# Patient Record
Sex: MALE | Race: OTHER | NOT HISPANIC OR LATINO | Employment: FULL TIME | URBAN - NONMETROPOLITAN AREA
[De-identification: names, ages, dates, MRNs, and addresses within clinical notes are randomized per-mention and may not be internally consistent; named-entity substitution may affect disease eponyms.]

---

## 2017-01-25 ENCOUNTER — TELEPHONE (OUTPATIENT)
Dept: ORTHOPEDIC SURGERY | Facility: CLINIC | Age: 57
End: 2017-01-25

## 2017-01-25 DIAGNOSIS — R52 PAIN: ICD-10-CM

## 2017-01-25 RX ORDER — MELOXICAM 15 MG/1
15 TABLET ORAL DAILY
Qty: 45 TABLET | Refills: 1 | Status: SHIPPED | OUTPATIENT
Start: 2017-01-25 | End: 2017-03-09 | Stop reason: SDUPTHER

## 2017-01-25 NOTE — TELEPHONE ENCOUNTER
Patient called needing an appt. For a shoulder injection and asked for a refill on his Meloxicam to get him through until he is seen. This was e-scribed to [facility] per RONAL/fran.

## 2017-03-09 ENCOUNTER — OFFICE VISIT (OUTPATIENT)
Dept: ORTHOPEDIC SURGERY | Facility: CLINIC | Age: 57
End: 2017-03-09

## 2017-03-09 DIAGNOSIS — R52 PAIN: ICD-10-CM

## 2017-03-09 DIAGNOSIS — Z96.642 STATUS POST TOTAL HIP REPLACEMENT, LEFT: ICD-10-CM

## 2017-03-09 DIAGNOSIS — M25.511 CHRONIC RIGHT SHOULDER PAIN: Primary | ICD-10-CM

## 2017-03-09 DIAGNOSIS — G89.29 CHRONIC RIGHT SHOULDER PAIN: Primary | ICD-10-CM

## 2017-03-09 DIAGNOSIS — M16.11 PRIMARY OSTEOARTHRITIS OF RIGHT HIP: ICD-10-CM

## 2017-03-09 PROCEDURE — 20610 DRAIN/INJ JOINT/BURSA W/O US: CPT | Performed by: ORTHOPAEDIC SURGERY

## 2017-03-09 PROCEDURE — 99214 OFFICE O/P EST MOD 30 MIN: CPT | Performed by: ORTHOPAEDIC SURGERY

## 2017-03-09 RX ORDER — MELOXICAM 15 MG/1
15 TABLET ORAL DAILY
Qty: 30 TABLET | Refills: 2 | Status: SHIPPED | OUTPATIENT
Start: 2017-03-09 | End: 2017-03-09 | Stop reason: SDUPTHER

## 2017-03-09 RX ORDER — MELOXICAM 15 MG/1
15 TABLET ORAL DAILY
Qty: 30 TABLET | Refills: 2 | Status: SHIPPED | OUTPATIENT
Start: 2017-03-09

## 2017-03-09 RX ADMIN — LIDOCAINE HYDROCHLORIDE 2 ML: 10 INJECTION, SOLUTION INFILTRATION; PERINEURAL at 15:10

## 2017-03-09 RX ADMIN — METHYLPREDNISOLONE ACETATE 160 MG: 80 INJECTION, SUSPENSION INTRA-ARTICULAR; INTRALESIONAL; INTRAMUSCULAR; SOFT TISSUE at 15:10

## 2017-03-09 NOTE — PROGRESS NOTES
Chief Complaint   Patient presents with   • Right Shoulder - Follow-up           HPI the patient is here today for a follow up of his right shoulder.  Patient states that he does have some shoulder pain with significant weakness in abduction.  He is unable to reach into the overhead position.  He has difficulty with cross body activities.  He is also complaining of some right hip pain but that is very tolerable.  The shoulder bothers him significantly because he is in a job requires a lot of repetitive motion specifically overhead and abducted shoulder position in the repetitive loading situation.  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  The patient is trying is best to change her job description and has been somewhat successful in getting a lighter job that is less stressful for the shoulder.  He still has lots of issues with his upper extremity.  He's had a prior surgical intervention by an orthopedic surgeon in [facility] which unfortunately did not produce any grade outcomes for this patient.  He is basically told that he has a large, unreconstructable rotator cuff tear that is not repairable  and eventually he is going to require a reverse total shoulder arthroplasty.        There were no vitals filed for this visit.        Review of Systems   Constitutional: Negative for activity change, appetite change, fatigue, fever and unexpected weight change.   HENT: Negative for congestion, sneezing and voice change.    Eyes: Negative for visual disturbance.   Respiratory: Negative for cough, chest tightness and wheezing.    Cardiovascular: Negative for chest pain, palpitations and leg swelling.   Gastrointestinal: Negative for abdominal distention, constipation, diarrhea and vomiting.   Endocrine: Negative for polydipsia, polyphagia and polyuria.   Genitourinary: Negative for decreased urine volume, difficulty urinating, dysuria, flank pain and frequency.    Musculoskeletal: Positive for myalgias. Negative for arthralgias, back pain, gait problem, joint swelling, neck pain and neck stiffness.   Skin: Negative for color change, pallor and wound.   Allergic/Immunologic: Negative for environmental allergies and food allergies.   Neurological: Negative for dizziness, weakness and headaches.   Hematological: Does not bruise/bleed easily.   Psychiatric/Behavioral: Negative for agitation, confusion, decreased concentration and sleep disturbance. The patient is not nervous/anxious.            Physical Exam   Constitutional: He is oriented to person, place, and time. He appears well-developed and well-nourished.   HENT:   Head: Normocephalic.   Eyes: Conjunctivae are normal. Pupils are equal, round, and reactive to light.   Neck: Normal range of motion. Neck supple. No JVD present.   Cardiovascular: Normal rate, normal heart sounds and intact distal pulses.    Pulmonary/Chest: Effort normal and breath sounds normal. No respiratory distress.   Abdominal: Soft. Bowel sounds are normal. There is no tenderness. There is no guarding.   Lymphadenopathy:     He has no cervical adenopathy.   Neurological: He is alert and oriented to person, place, and time. He has normal reflexes.   Skin: Skin is warm and dry.   Psychiatric: His behavior is normal. Judgment and thought content normal.   Vitals reviewed.          Joint/Body Part Specific Exam:  right shoulder. The shoulder is extremely tender anteriorly. There is tenderness over the insertion of the rotator cuff over the greater tuberosity of the humerus. Slight proximal migration of the humeral head is noted. AC joint is tender. Crossover adduction test is positive. Drop arm sign is positive. Forward flexion is 0-100 and degrees, abduction is 0-120 degrees, external rotation is 0-40 degrees. Patient has mild atrophy both of the supra and infraspinatus fossa. Sulcus sign is negative. There is no evidence of multidirectional  instability. Neer test is positive on compression. Skin and soft tissue tenderness is noted. Patient’s strength in abduction and external rotation are extremely lacking. The pain level is 5.  Prior healed incisions from an arthroscopic surgery are visible.    right Hip. Skin and soft tissues over the greater trochanteric bursa are painful and tender for the patient. Neurovascular status is intact. IT band is painful and tender. Cross body adduction bothers the patient significantly. Internal and external rotations bother the patient significantly with tenderness over the greater trochanter. There is no clinical deformity. No shortening. The patient does have a significant limp because by the trochanteric pain caused by the abductors. The piriformis is tight and with any rotation there is significant capsular tightness. Dorsalis pedis and posterior tibial artery pulses are palpable. Capillary refill is 2 seconds with a brisk return. Common peroneal nerve function is well preserved.      Diagnostics:        Demarcus was seen today for follow-up.    Diagnoses and all orders for this visit:    Chronic right shoulder pain  -     Large Joint Arthrocentesis    Pain  -     Discontinue: meloxicam (MOBIC) 15 MG tablet; Take 1 tablet by mouth Daily.  -     meloxicam (MOBIC) 15 MG tablet; Take 1 tablet by mouth Daily.    Primary osteoarthritis of right hip    Status post total hip replacement, left          Large Joint Arthrocentesis  Date/Time: 3/9/2017 3:10 PM  Consent given by: patient  Site marked: site marked  Timeout: Immediately prior to procedure a time out was called to verify the correct patient, procedure, equipment, support staff and site/side marked as required   Supporting Documentation  Indications: pain   Procedure Details  Location: shoulder - R subacromial bursa  Preparation: Patient was prepped and draped in the usual sterile fashion  Needle size: 25 G  Approach: anteromedial  Medications administered: 2 mL  lidocaine 1 %; 160 mg methylPREDNISolone acetate 80 MG/ML  Patient tolerance: patient tolerated the procedure well with no immediate complications              Plan:  , GI and dental procedure prophylaxis with antibiotics for the left hip arthroplasty implants to prevent a metastatic infection.    Weightbearing as tolerated.      Time spent in the office the patient 30 minutes.    Home-based exercise program to the shoulder to prevent a frozen shoulder.    No indication for surgical intervention of the shoulder at this point.  He is he can be addressed.    Tablet ibuprofen 600 mg tab 1 by mouth twice a day when necessary pain and discomfort.    The patient was seen in the office today for preoperative discussion.  The patient has been tried on over-the-counter and prescription NSAID's despite the risks of anti-inflammatory bleeding, peptic ulcers and erosive gastritis with short term benefit only.  Braces have been prescribed for mechanical support.  Patient has been participating in an exercise program specifically targeting joint pain relief with limited benefit. Intraarticular injections have been used periodically with some but not complete relief of pain.  Ambulation aids have also been utilized.      The details of the surgical procedure were explained including the location of probable incisions and a description of the likely hardware/grafts to be used. The patient understands the likely convalescence after surgery as well as the rehabilitation required.  Also, we have thoroughly discussed with the patient the risks, benefits and alternatives to surgery.  Risks include but are not limited to the risk of infection, joint stiffness, limited range of motion, wound healing problems, scar tissue build up, myocardial infarction, stroke, blood clots (including DVT and/or pulmonary embolus along with the risk of death) neurologic and/or vascular injury, limb length discrepancy, fracture, dislocation, nonunion,  malunion, continued pain and need for further surgery including hardware failure requiring revision.      Issues with general lost a reverse total shoulder arthroplasty or been discussed with the patient great detail.    [place] papers signed for the patient so that he can have some intermittent days off from work as needed for his symptoms and for improvement in his quality of life and to protect his shoulder from further injury.    Follow-up in 3 months.

## 2017-03-12 PROBLEM — M16.11 PRIMARY OSTEOARTHRITIS OF RIGHT HIP: Status: ACTIVE | Noted: 2017-03-12

## 2017-03-12 PROBLEM — Z96.642 STATUS POST TOTAL HIP REPLACEMENT, LEFT: Status: ACTIVE | Noted: 2017-03-12

## 2017-03-12 RX ORDER — METHYLPREDNISOLONE ACETATE 80 MG/ML
160 INJECTION, SUSPENSION INTRA-ARTICULAR; INTRALESIONAL; INTRAMUSCULAR; SOFT TISSUE
Status: COMPLETED | OUTPATIENT
Start: 2017-03-09 | End: 2017-03-09

## 2017-03-12 RX ORDER — LIDOCAINE HYDROCHLORIDE 10 MG/ML
2 INJECTION, SOLUTION INFILTRATION; PERINEURAL
Status: COMPLETED | OUTPATIENT
Start: 2017-03-09 | End: 2017-03-09

## 2017-03-27 ENCOUNTER — TELEPHONE (OUTPATIENT)
Dept: ORTHOPEDIC SURGERY | Facility: CLINIC | Age: 57
End: 2017-03-27

## 2017-03-27 NOTE — TELEPHONE ENCOUNTER
Patient has called several times regarding his FMLA papers. I explained that the papers have been filled out the same for the past 3 years. RONAL agreed to give him off 3 days per month to use however he needed. Now the patient is calling and requesting it be changed. When I asked him the first time how many days he said to leave it the same, the second time he states to ask Ronal how many days and I told him RONAL already said 3 days. Now he calls back for the third time and wants 10 days per month allowed off if needed. Please advise/rj.

## 2017-03-28 NOTE — TELEPHONE ENCOUNTER
I feel that 10 days a month off work with FMLA is excessive. I would go with 3 days off maximum 5 days a month off based on symptom complex. Otherwise patient can have his PCP take care of FMLA papers and come up with time off that works for patient and his PCP.  Thanks Tomy

## 2017-06-08 ENCOUNTER — CLINICAL SUPPORT (OUTPATIENT)
Dept: ORTHOPEDIC SURGERY | Facility: CLINIC | Age: 57
End: 2017-06-08

## 2017-06-08 DIAGNOSIS — M25.512 CHRONIC LEFT SHOULDER PAIN: ICD-10-CM

## 2017-06-08 DIAGNOSIS — G89.29 CHRONIC RIGHT SHOULDER PAIN: Primary | ICD-10-CM

## 2017-06-08 DIAGNOSIS — M25.511 CHRONIC RIGHT SHOULDER PAIN: Primary | ICD-10-CM

## 2017-06-08 DIAGNOSIS — G89.29 CHRONIC LEFT SHOULDER PAIN: ICD-10-CM

## 2017-06-08 PROCEDURE — 99213 OFFICE O/P EST LOW 20 MIN: CPT | Performed by: ORTHOPAEDIC SURGERY

## 2017-06-08 PROCEDURE — 20610 DRAIN/INJ JOINT/BURSA W/O US: CPT | Performed by: ORTHOPAEDIC SURGERY

## 2017-06-08 RX ORDER — MELOXICAM 7.5 MG/1
15 TABLET ORAL DAILY
Qty: 45 TABLET | Refills: 2 | Status: SHIPPED | OUTPATIENT
Start: 2017-06-08

## 2017-06-08 RX ADMIN — METHYLPREDNISOLONE ACETATE 160 MG: 80 INJECTION, SUSPENSION INTRA-ARTICULAR; INTRALESIONAL; INTRAMUSCULAR; SOFT TISSUE at 14:45

## 2017-06-08 RX ADMIN — METHYLPREDNISOLONE ACETATE 160 MG: 80 INJECTION, SUSPENSION INTRA-ARTICULAR; INTRALESIONAL; INTRAMUSCULAR; SOFT TISSUE at 15:20

## 2017-06-08 RX ADMIN — LIDOCAINE HYDROCHLORIDE 2 ML: 10 INJECTION, SOLUTION INFILTRATION; PERINEURAL at 15:20

## 2017-06-08 RX ADMIN — LIDOCAINE HYDROCHLORIDE 2 ML: 10 INJECTION, SOLUTION INFILTRATION; PERINEURAL at 14:45

## 2017-06-08 NOTE — PROGRESS NOTES
Demarcus Edwards  ?  1960  ?  Chief Complaint   Patient presents with   • Right Shoulder - Follow-up     ?  HPI the patient is here today for a follow up of his right and left shoulder pain and discomfort. Patient has weakness in abduction with difficulty in reaching the overhead position.  He works hard, physically demanding job and by the end of the workday his shoulders are bothering him very significantly.  He is unable to lift heavy objects into the overhead position.  Cross body adduction activities bother him very significantly.  A right rotator cuff tear repair was attempted by an orthopedic surgeon in [facility].  That failed to provide him either with improved shoulder function or muscle strength or pain relief.  He has progressively developed a cuff tear arthropathy in the right shoulder.  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  I have recommended that she should wait till he is in his mid 60s and closer alf before I would consider surgical intervention.  My rationale is based on the possibility of early and premature failure of that arthroplasty because of the large muscular shoulder that he has and the heavy manual and physical labor that he is required to perform.    Physical Exam   Constitutional: He is oriented to person, place, and time. He appears well-nourished.   HENT:   Head: Atraumatic.   Eyes: EOM are normal.   Neck: Neck supple.   Cardiovascular: Normal rate and intact distal pulses.    Pulmonary/Chest: Effort normal and breath sounds normal.   Abdominal: Soft.   Musculoskeletal: Normal range of motion. He exhibits edema and tenderness.   Neurological: He is alert and oriented to person, place, and time. He has normal reflexes.   Skin: Skin is warm.   Psychiatric: He has a normal mood and affect. His behavior is normal. Judgment and thought content normal.   Nursing note  and vitals reviewed.      Review of Systems   Constitutional: Negative.    HENT: Negative.    Eyes: Negative.    Respiratory: Negative.    Cardiovascular: Negative.    Gastrointestinal: Negative.    Endocrine: Negative.    Genitourinary: Negative.    Musculoskeletal: Negative.    Skin: Negative.    Allergic/Immunologic: Negative.    Neurological: Negative.    Hematological: Negative.    Psychiatric/Behavioral: Negative.        Joint/Body Part Specific Exam:   bilateral shoulder. Rotator cuff function is extremely impaired. There is a high riding humeral head with articulation of the humerus to the undersurface of the acromiohumeral articulation. AC joint is exquisitely tender and painful for patient. Axillary nerve function is well preserved. There is significant winging of the scapula with hollowing of the fossae over the proximal and distal aspects of the spine of the scapula. Forward flexion is 0-120 degrees, active abduction is 0-90 degrees. Drop arm sign is positive. External rotation against resistance is extremely painful and limited for the patient. Skin and soft tissues are essentially normal other than some amounts of swelling. The pain level is 6.  Previously healed surgical incision on the lateral side of the right shoulder are noted.    Cervical Spine: Skin and soft tissues are mildly swollen. Deep tendon reflexes bilateral, symmetric and equal on both upper and lower extremities. Cough impulse is positive and invokes pain for the patient. No objective motor or sensory function deficit is present. No long tract signs. No myelopathy. No bowel or bladder deficit. Mild spasm of erector spinae muscle is present.Lateral rotations of the spine are limited in range of motion and painful for the patient. No evidence of myelopathy is noted.  bilaterally Sided rotation associated with pain and discomfort.  Patient has symptoms of upper extremity neurogenic claudication and has been diagnosed with cervical spine  stenosis by his neurosurgeon.  X-Ray Report:    Diagnostics:    Large Joint Arthrocentesis  Date/Time: 6/8/2017 2:45 PM  Consent given by: patient  Site marked: site marked  Timeout: Immediately prior to procedure a time out was called to verify the correct patient, procedure, equipment, support staff and site/side marked as required   Supporting Documentation  Indications: pain   Procedure Details  Location: shoulder - R subacromial bursa  Preparation: Patient was prepped and draped in the usual sterile fashion  Needle size: 25 G  Approach: anteromedial  Medications administered: 2 mL lidocaine 1 %; 160 mg methylPREDNISolone acetate 80 MG/ML  Patient tolerance: patient tolerated the procedure well with no immediate complications    Large Joint Arthrocentesis  Date/Time: 6/8/2017 3:20 PM  Consent given by: patient  Site marked: site marked  Timeout: Immediately prior to procedure a time out was called to verify the correct patient, procedure, equipment, support staff and site/side marked as required   Supporting Documentation  Indications: pain   Procedure Details  Location: shoulder - L subacromial bursa  Preparation: Patient was prepped and draped in the usual sterile fashion  Needle size: 25 G  Approach: anteromedial  Medications administered: 2 mL lidocaine 1 %; 160 mg methylPREDNISolone acetate 80 MG/ML  Patient tolerance: patient tolerated the procedure well with no immediate complications        ?  ?  Plan      · Ice pack for 48 hrs     · Injected patients bilateral shoulder joints with Steroid    · Ace wrap for swelling PRN    · Elevation for swelling PRN    · Tablet Ibuprofen 600 mg P.O. BID x 5 Days with meals    · Call office for any problems  With procedure    · Home Physical Therapy Program discussed with patient    · F/U in 3 months for Re-evaluation      · Tablet Mobic 15 mg tab 1 by mouth daily at bedtime when necessary pain and discomfort.  ·   · Patient needs to follow with his spinal surgeon to  address his cervical spine stenosis.  ·   · He needs to be considered for a NORI at the pain clinic for his cervical spine symptoms.  Not a candidate for surgical replacement of the shoulder just yet but eventually he will need a reverse total shoulder arthroplasty.

## 2017-06-15 PROBLEM — G89.29 CHRONIC LEFT SHOULDER PAIN: Status: ACTIVE | Noted: 2017-06-15

## 2017-06-15 PROBLEM — M25.512 CHRONIC LEFT SHOULDER PAIN: Status: ACTIVE | Noted: 2017-06-15

## 2017-06-15 RX ORDER — LIDOCAINE HYDROCHLORIDE 10 MG/ML
2 INJECTION, SOLUTION INFILTRATION; PERINEURAL
Status: COMPLETED | OUTPATIENT
Start: 2017-06-08 | End: 2017-06-08

## 2017-06-15 RX ORDER — METHYLPREDNISOLONE ACETATE 80 MG/ML
160 INJECTION, SUSPENSION INTRA-ARTICULAR; INTRALESIONAL; INTRAMUSCULAR; SOFT TISSUE
Status: COMPLETED | OUTPATIENT
Start: 2017-06-08 | End: 2017-06-08

## 2018-05-01 ENCOUNTER — CLINICAL SUPPORT (OUTPATIENT)
Dept: ORTHOPEDIC SURGERY | Facility: CLINIC | Age: 58
End: 2018-05-01

## 2018-05-01 DIAGNOSIS — M75.101 ROTATOR CUFF TEAR ARTHROPATHY OF RIGHT SHOULDER: ICD-10-CM

## 2018-05-01 DIAGNOSIS — M25.511 CHRONIC RIGHT SHOULDER PAIN: Primary | ICD-10-CM

## 2018-05-01 DIAGNOSIS — G89.29 CHRONIC RIGHT SHOULDER PAIN: Primary | ICD-10-CM

## 2018-05-01 DIAGNOSIS — M12.811 ROTATOR CUFF TEAR ARTHROPATHY OF RIGHT SHOULDER: ICD-10-CM

## 2018-05-01 PROCEDURE — 20610 DRAIN/INJ JOINT/BURSA W/O US: CPT | Performed by: ORTHOPAEDIC SURGERY

## 2018-05-01 PROCEDURE — 99213 OFFICE O/P EST LOW 20 MIN: CPT | Performed by: ORTHOPAEDIC SURGERY

## 2018-05-01 PROCEDURE — 73020 X-RAY EXAM OF SHOULDER: CPT | Performed by: ORTHOPAEDIC SURGERY

## 2018-05-01 RX ADMIN — METHYLPREDNISOLONE ACETATE 160 MG: 80 INJECTION, SUSPENSION INTRA-ARTICULAR; INTRALESIONAL; INTRAMUSCULAR; SOFT TISSUE at 15:40

## 2018-05-01 RX ADMIN — LIDOCAINE HYDROCHLORIDE 2 ML: 10 INJECTION, SOLUTION INFILTRATION; PERINEURAL at 15:40

## 2018-05-01 NOTE — PROGRESS NOTES
Chief Complaint   Patient presents with   • Right Shoulder - Follow-up           HPI the patient is here today for a follow up of right shoulder pain and discomfort. He states that his pain in his shoulder is getting progressively worse.  He is getting significant disability at this point because he cannot reach into the overhead abducted position.  Cross body activities bother him significantly.  He works at [employer], and states that he is getting progressive loss of range of motion which is impairing his ability to perform his professional responsibilities.  He has a lot of nighttime pain and discomfort.  He recently lost his mother and therefore was unable to keep his appointment.  He states that his symptoms are slowly getting worse.  He states that there are some days that he cannot carry out his job responsibilities at work.  There are some other days when he does fairly well.  We have discussed the concept of the family leave act and to determine if he can be placed on FML a to improve his quality of life and to minimize that further deterioration off the shoulder.  He had a shoulder arthroscopy performed by Dr. Alexandra in [place] and that has made symptoms are little bit worse.  I have discussed with him that he does not have any tissue to work with as far as the rotator cuff tear is concerned and eventually he is going to need a total shoulder arthroplasty.        There were no vitals filed for this visit.        Review of Systems   Constitutional: Negative.    Eyes: Negative.    Respiratory: Negative.    Cardiovascular: Negative.    Gastrointestinal: Negative.    Endocrine: Negative.    Genitourinary: Negative.    Musculoskeletal: Negative.    Skin: Negative.    Allergic/Immunologic: Negative.    Neurological: Negative.    Hematological: Negative.    Psychiatric/Behavioral: Negative.            Physical Exam   Constitutional: He is oriented to person, place, and time. He appears  well-nourished.   HENT:   Head: Atraumatic.   Eyes: EOM are normal.   Neck: Neck supple.   Cardiovascular: Normal rate and regular rhythm.    Pulmonary/Chest: Breath sounds normal.   Abdominal: Bowel sounds are normal.   Musculoskeletal: He exhibits edema.   Neurological: He is alert and oriented to person, place, and time. He has normal reflexes.   Skin: Skin is warm. Capillary refill takes 2 to 3 seconds.   Psychiatric: He has a normal mood and affect. His behavior is normal. Judgment and thought content normal.   Nursing note and vitals reviewed.          Joint/Body Part Specific Exam:  right shoulder. Rotator cuff function is extremely impaired. There is a high riding humeral head with articulation of the humerus to the undersurface of the acromiohumeral articulation. AC joint is exquisitely tender and painful for patient. Axillary nerve function is well preserved. There is significant winging of the scapula with hollowing of the fossae over the proximal and distal aspects of the spine of the scapula. Forward flexion is 0-90 degrees, active abduction is 0-60 degrees. Drop arm sign is positive. External rotation against resistance is extremely painful and limited for the patient. Skin and soft tissues are essentially normal other than some amounts of swelling. The pain level is 6.  Previous arthroscopic surgical portals are noted anteriorly and posteriorly.  These incisions are completely healed.  He has distinct signs of rotator cuff insufficiency.        X-RAY Report:        Diagnostics:        Demarcus was seen today for follow-up.    Diagnoses and all orders for this visit:    Chronic right shoulder pain            Large Joint Arthrocentesis  Date/Time: 5/1/2018 3:40 PM  Consent given by: patient  Site marked: site marked  Timeout: Immediately prior to procedure a time out was called to verify the correct patient, procedure, equipment, support staff and site/side marked as required   Supporting  Documentation  Indications: pain   Procedure Details  Location: shoulder - R subacromial bursa  Preparation: Patient was prepped and draped in the usual sterile fashion  Needle size: 25 G  Approach: anteromedial  Medications administered: 2 mL lidocaine 1 %; 160 mg methylPREDNISolone acetate 80 MG/ML  Patient tolerance: patient tolerated the procedure well with no immediate complications              Plan:  Injected patient's right shoulder into the subacromial bursa with a steroid from an anteromedial approach.    Stretching and strengthening exercises of the shoulder to prevent adhesive capsulitis and a frozen shoulder.    Tablet ibuprofen 600 mg orally twice a day for pain swelling and discomfort.    Given the patient a prescription of Ultram 50 mg tab 1 by mouth daily at bedtime for breakthrough pain.    We have discussed signing the patients FMLA papers and discussed with him about allowing him to have 3 days off in any given month specifically if his shoulder pain bothers him to the point that he cannot normally function.    Role of intra-articular injections as a temporary intervention discussed with the patient.    Eventually he is going to need reverse total shoulder arthroplasty for his severe cuff tear arthropathy with rotator cuff deficiency.    Patient would like to defer the surgical intervention for as long as possible and I certainly agree with that.    Follow-up in my office in 3 months.    Controlled substance treatment options discussed in detail. Patient's signed consent to medical options on file. KATHRIN form in chart.  The patient is being provided this narcotic prescription to address the acute medical condition that they are undergoing/experiencing at this time.  It is my medical and surgical assessment that more than 3 days of narcotic medication is necessary to help control the pain and discomfort that this patient is experiencing at this point.  Risks of narcotic medication usage outlined.   Possibility of physical and psychological dependence and abuse, especially long term, emphasized to the patient.  I have explained to the patient that we will try to wean them off the narcotic medication as soon as possible and introduce non-narcotic modalities of pain control.  At this point in the patient's clinical spectrum, however, alternative available treatments are inadequate to control their pain and symptoms.  I have also discussed the possibility of random drug testing as well as pill counts to prevent misuse and misappropriation of the narcotic medications prescribed to the patient.

## 2018-05-10 RX ORDER — LIDOCAINE HYDROCHLORIDE 10 MG/ML
2 INJECTION, SOLUTION INFILTRATION; PERINEURAL
Status: COMPLETED | OUTPATIENT
Start: 2018-05-01 | End: 2018-05-01

## 2018-05-10 RX ORDER — TRAMADOL HYDROCHLORIDE 50 MG/1
50 TABLET ORAL NIGHTLY PRN
Qty: 40 TABLET | Refills: 0 | OUTPATIENT
Start: 2018-05-10

## 2018-05-10 RX ORDER — METHYLPREDNISOLONE ACETATE 80 MG/ML
160 INJECTION, SUSPENSION INTRA-ARTICULAR; INTRALESIONAL; INTRAMUSCULAR; SOFT TISSUE
Status: COMPLETED | OUTPATIENT
Start: 2018-05-01 | End: 2018-05-01